# Patient Record
Sex: MALE | Race: WHITE | NOT HISPANIC OR LATINO | Employment: FULL TIME | URBAN - NONMETROPOLITAN AREA
[De-identification: names, ages, dates, MRNs, and addresses within clinical notes are randomized per-mention and may not be internally consistent; named-entity substitution may affect disease eponyms.]

---

## 2024-07-31 ENCOUNTER — APPOINTMENT (OUTPATIENT)
Dept: PRIMARY CARE | Facility: CLINIC | Age: 29
End: 2024-07-31
Payer: COMMERCIAL

## 2024-07-31 VITALS
BODY MASS INDEX: 26.1 KG/M2 | WEIGHT: 186.4 LBS | SYSTOLIC BLOOD PRESSURE: 112 MMHG | DIASTOLIC BLOOD PRESSURE: 73 MMHG | HEIGHT: 71 IN | HEART RATE: 76 BPM

## 2024-07-31 DIAGNOSIS — Z00.00 WELLNESS EXAMINATION: ICD-10-CM

## 2024-07-31 DIAGNOSIS — Z13.6 SCREENING FOR CARDIOVASCULAR CONDITION: Primary | ICD-10-CM

## 2024-07-31 PROCEDURE — 3008F BODY MASS INDEX DOCD: CPT

## 2024-07-31 PROCEDURE — 99385 PREV VISIT NEW AGE 18-39: CPT

## 2024-07-31 PROCEDURE — 1036F TOBACCO NON-USER: CPT

## 2024-07-31 PROCEDURE — 99204 OFFICE O/P NEW MOD 45 MIN: CPT

## 2024-07-31 ASSESSMENT — ENCOUNTER SYMPTOMS
SINUS PAIN: 0
EYE PAIN: 0
DIARRHEA: 0
CHILLS: 0
NAUSEA: 0
FLANK PAIN: 0
DIFFICULTY URINATING: 0
VOMITING: 0
EYE REDNESS: 0
HEADACHES: 0
PALPITATIONS: 0
COUGH: 0
NUMBNESS: 0
SORE THROAT: 0
SHORTNESS OF BREATH: 0
ABDOMINAL PAIN: 0
COLOR CHANGE: 0
HEMATURIA: 0
CONSTIPATION: 0
EYE ITCHING: 0
HALLUCINATIONS: 0
SINUS PRESSURE: 0
DIZZINESS: 0
FEVER: 0
FATIGUE: 0
WEAKNESS: 0
BACK PAIN: 0

## 2024-07-31 NOTE — PROGRESS NOTES
"Subjective   Patient ID: Raad Barrett is a 29 y.o. male who presents for Establish Care and Hives.    HPI     Here today to establish care as a new patient  Yearly physical and lab work ordered     Acute concerns include hives that are flaring up the past 3 weeks, he has been taking zyrtec daily and the rash has subsided with the zyrtec. He explained the rash does itch when it comes and starts on ankles, moves to wrists and hands then spreads from there. Denies any new soaps, detergent, or food sensitivities that he can think of. We did discuss allergy testing and at this time her would like to manage with Zyrtec and determine if this could be heat or stress related as well. We also discussed prednisone if the rash persists despite his zyrtec.     We discussed diet and exercise recommended a healthy diet and 30 minutes of exercise 3 times a week.     Review of Systems   Constitutional:  Negative for chills, fatigue and fever.   HENT:  Negative for congestion, sinus pressure, sinus pain and sore throat.    Eyes:  Negative for pain, redness and itching.   Respiratory:  Negative for cough and shortness of breath.    Cardiovascular:  Negative for chest pain, palpitations and leg swelling.   Gastrointestinal:  Negative for abdominal pain, constipation, diarrhea, nausea and vomiting.   Endocrine: Negative for cold intolerance and heat intolerance.   Genitourinary:  Negative for difficulty urinating, flank pain and hematuria.   Musculoskeletal:  Negative for back pain and gait problem.   Skin:  Negative for color change.   Neurological:  Negative for dizziness, weakness, numbness and headaches.   Psychiatric/Behavioral:  Negative for hallucinations and suicidal ideas.        Objective   /73 (Patient Position: Sitting)   Pulse 76   Ht 1.815 m (5' 11.46\")   Wt 84.6 kg (186 lb 6.4 oz)   BMI 25.67 kg/m²     Physical Exam  Vitals and nursing note reviewed.   Constitutional:       Appearance: Normal appearance.   Eyes:    "   Extraocular Movements: Extraocular movements intact.   Cardiovascular:      Rate and Rhythm: Normal rate and regular rhythm.   Pulmonary:      Effort: Pulmonary effort is normal.      Breath sounds: Normal breath sounds.   Abdominal:      General: Abdomen is flat. Bowel sounds are normal.      Palpations: Abdomen is soft.   Musculoskeletal:      Cervical back: Normal range of motion.   Lymphadenopathy:      Cervical: No cervical adenopathy.   Skin:     General: Skin is warm and dry.      Capillary Refill: Capillary refill takes less than 2 seconds.   Neurological:      Mental Status: He is alert and oriented to person, place, and time.   Psychiatric:         Mood and Affect: Mood normal.         Behavior: Behavior normal.         Assessment/Plan   Problem List Items Addressed This Visit    None  Visit Diagnoses         Codes    Screening for cardiovascular condition    -  Primary Z13.6    Relevant Orders    Basic Metabolic Panel    Lipid Panel    CBC and Auto Differential    Wellness examination     Z00.00    Relevant Orders    Hepatic function panel             Hives  -Continue antihistamine daily  -Referral to allergy if need be   -Prednisone taper if hives persist despite Zyrtec     Wellness   -lab work ordered   -Will follow